# Patient Record
Sex: FEMALE
[De-identification: names, ages, dates, MRNs, and addresses within clinical notes are randomized per-mention and may not be internally consistent; named-entity substitution may affect disease eponyms.]

---

## 2017-04-05 ENCOUNTER — APPOINTMENT (OUTPATIENT)
Dept: NEPHROLOGY | Facility: CLINIC | Age: 42
End: 2017-04-05

## 2017-07-27 ENCOUNTER — RX RENEWAL (OUTPATIENT)
Age: 42
End: 2017-07-27

## 2018-02-21 DIAGNOSIS — Z00.00 ENCOUNTER FOR GENERAL ADULT MEDICAL EXAMINATION W/OUT ABNORMAL FINDINGS: ICD-10-CM

## 2018-07-16 ENCOUNTER — RX RENEWAL (OUTPATIENT)
Age: 43
End: 2018-07-16

## 2018-08-10 ENCOUNTER — RX RENEWAL (OUTPATIENT)
Age: 43
End: 2018-08-10

## 2018-10-15 ENCOUNTER — RX RENEWAL (OUTPATIENT)
Age: 43
End: 2018-10-15

## 2018-10-19 ENCOUNTER — RX RENEWAL (OUTPATIENT)
Age: 43
End: 2018-10-19

## 2019-04-23 ENCOUNTER — RX RENEWAL (OUTPATIENT)
Age: 44
End: 2019-04-23

## 2019-04-29 ENCOUNTER — RX RENEWAL (OUTPATIENT)
Age: 44
End: 2019-04-29

## 2019-08-26 ENCOUNTER — RX RENEWAL (OUTPATIENT)
Age: 44
End: 2019-08-26

## 2019-12-06 RX ORDER — TOPIRAMATE 100 MG/1
100 TABLET, FILM COATED ORAL
Refills: 0 | Status: ACTIVE | COMMUNITY
Start: 2019-12-06

## 2020-01-06 ENCOUNTER — APPOINTMENT (OUTPATIENT)
Dept: NEPHROLOGY | Facility: CLINIC | Age: 45
End: 2020-01-06
Payer: COMMERCIAL

## 2020-01-06 VITALS
DIASTOLIC BLOOD PRESSURE: 76 MMHG | OXYGEN SATURATION: 99 % | BODY MASS INDEX: 20.66 KG/M2 | WEIGHT: 128 LBS | HEART RATE: 66 BPM | SYSTOLIC BLOOD PRESSURE: 119 MMHG

## 2020-01-06 PROCEDURE — 99214 OFFICE O/P EST MOD 30 MIN: CPT

## 2020-01-06 RX ORDER — OXYCODONE AND ACETAMINOPHEN 10; 325 MG/1; MG/1
10-325 TABLET ORAL
Qty: 26 | Refills: 0 | Status: COMPLETED | COMMUNITY
Start: 2019-10-18

## 2020-01-06 RX ORDER — AZITHROMYCIN 250 MG/1
250 TABLET, FILM COATED ORAL
Qty: 6 | Refills: 0 | Status: COMPLETED | COMMUNITY
Start: 2019-05-12

## 2020-01-06 RX ORDER — LEVOTHYROXINE SODIUM 112 UG/1
112 TABLET ORAL
Qty: 90 | Refills: 0 | Status: ACTIVE | COMMUNITY
Start: 2019-09-20

## 2020-01-06 RX ORDER — TOPIRAMATE 50 MG/1
50 TABLET, FILM COATED ORAL
Qty: 270 | Refills: 0 | Status: COMPLETED | COMMUNITY
Start: 2019-10-25

## 2020-01-06 RX ORDER — CIPROFLOXACIN HYDROCHLORIDE 500 MG/1
500 TABLET, FILM COATED ORAL
Qty: 14 | Refills: 0 | Status: COMPLETED | COMMUNITY
Start: 2019-09-10

## 2020-01-06 RX ORDER — METHYLPREDNISOLONE 4 MG/1
4 TABLET ORAL
Qty: 21 | Refills: 0 | Status: COMPLETED | COMMUNITY
Start: 2019-05-12

## 2020-01-06 RX ORDER — CYANOCOBALAMIN 1000 UG/ML
1000 INJECTION INTRAMUSCULAR; SUBCUTANEOUS
Qty: 13 | Refills: 0 | Status: DISCONTINUED | COMMUNITY
Start: 2018-04-16 | End: 2020-01-06

## 2020-01-06 RX ORDER — PROMETHAZINE HYDROCHLORIDE 25 MG/1
25 TABLET ORAL
Qty: 15 | Refills: 0 | Status: COMPLETED | COMMUNITY
Start: 2019-10-18

## 2020-01-06 NOTE — REVIEW OF SYSTEMS
[Feeling Poorly] : not feeling poorly [Feeling Tired] : not feeling tired [Recent Weight Gain (___ Lbs)] : recent [unfilled] ~Ulb weight gain [Loss Of Hearing] : no hearing loss [Sore Throat] : no sore throat [Chest Pain] : no chest pain [Lower Ext Edema] : lower extremity edema [Shortness Of Breath] : no shortness of breath [PND] : no PND [Constipation] : no constipation [Anxiety] : anxiety [Negative] : Heme/Lymph [FreeTextEntry9] : s/p  B toe surgery  [de-identified] : hx of Migraines HAs, CVA, ?sz

## 2020-01-06 NOTE — PHYSICAL EXAM
[General Appearance - Alert] : alert [General Appearance - In No Acute Distress] : in no acute distress [Sclera] : the sclera and conjunctiva were normal [Oropharynx] : the oropharynx was normal [Jugular Venous Distention Increased] : there was no jugular-venous distention [Respiration, Rhythm And Depth] : normal respiratory rhythm and effort [Auscultation Breath Sounds / Voice Sounds] : lungs were clear to auscultation bilaterally [Heart Rate And Rhythm] : heart rate was normal and rhythm regular [Heart Sounds] : normal S1 and S2 [Murmurs] : no murmurs [Abdomen Soft] : soft [Abnormal Walk] : normal gait [Musculoskeletal - Swelling] : no joint swelling seen [] : no rash [FreeTextEntry1] : B toe scars  [Skin Lesions] : no skin lesions [No Focal Deficits] : no focal deficits [Oriented To Time, Place, And Person] : oriented to person, place, and time

## 2020-01-06 NOTE — HISTORY OF PRESENT ILLNESS
[FreeTextEntry1] : she is having more swelling, s/p Toe surgery in Late October. past 6 weeks more swollen. labs show 4.1 grams of proteinuria./ PLAR2A negative. repeat in late Dec done to 2.1gm. \par she had MN in 1991 treated with steroids but was stopped prematurely due to psychotic episode.\par then 2003 had preeclampsia and relapse of MN treated with ACE/ARB combination. \par we increased torsemide to daily last month with good relief \par also started Lipitor 10mg daily( was self DCed)\par and added  asa 81.\par edema is better. no SOB. \par \par \par

## 2020-01-06 NOTE — ASSESSMENT
[FreeTextEntry1] : relapsed MN. negative PLar2A, improving spontaneously, Normal cr ( for 30 years) all point to a secondary process\par plan\par 1. increase candesartan to 32 mg pm and 4mg am( and titrate to 8mg as BP allows)\par 2. increase Torsemide to BID\par 3. repeat blood and urine work in 1 month'\par 4. she is here in June so will come for f/u then.\par 5 HCM uptodate with PAP, mammogram AND COLONOSCOPY\par

## 2020-01-09 ENCOUNTER — RX RENEWAL (OUTPATIENT)
Age: 45
End: 2020-01-09

## 2020-06-02 ENCOUNTER — RX RENEWAL (OUTPATIENT)
Age: 45
End: 2020-06-02

## 2020-09-08 ENCOUNTER — RX RENEWAL (OUTPATIENT)
Age: 45
End: 2020-09-08

## 2021-01-12 ENCOUNTER — RX RENEWAL (OUTPATIENT)
Age: 46
End: 2021-01-12

## 2021-02-09 ENCOUNTER — RX RENEWAL (OUTPATIENT)
Age: 46
End: 2021-02-09

## 2021-07-30 ENCOUNTER — RX RENEWAL (OUTPATIENT)
Age: 46
End: 2021-07-30

## 2022-01-18 DIAGNOSIS — N04.9 NEPHROTIC SYNDROME WITH UNSPECIFIED MORPHOLOGIC CHANGES: ICD-10-CM

## 2022-01-18 DIAGNOSIS — E53.8 DEFICIENCY OF OTHER SPECIFIED B GROUP VITAMINS: ICD-10-CM

## 2022-02-16 ENCOUNTER — APPOINTMENT (OUTPATIENT)
Dept: NEPHROLOGY | Facility: CLINIC | Age: 47
End: 2022-02-16
Payer: COMMERCIAL

## 2022-02-16 VITALS — SYSTOLIC BLOOD PRESSURE: 108 MMHG | WEIGHT: 126 LBS | DIASTOLIC BLOOD PRESSURE: 60 MMHG | BODY MASS INDEX: 20.34 KG/M2

## 2022-02-16 PROCEDURE — 99213 OFFICE O/P EST LOW 20 MIN: CPT | Mod: 95

## 2022-02-16 RX ORDER — MELOXICAM 7.5 MG/1
7.5 TABLET ORAL
Qty: 20 | Refills: 0 | Status: DISCONTINUED | COMMUNITY
Start: 2022-01-26

## 2022-02-16 RX ORDER — BUPROPION HYDROCHLORIDE 300 MG/1
300 TABLET, EXTENDED RELEASE ORAL DAILY
Refills: 0 | Status: ACTIVE | COMMUNITY
Start: 2022-02-16

## 2022-02-16 RX ORDER — ASPIRIN 81 MG/1
81 TABLET ORAL
Qty: 90 | Refills: 3 | Status: DISCONTINUED | COMMUNITY
Start: 2021-07-30 | End: 2022-02-16

## 2022-02-16 NOTE — HISTORY OF PRESENT ILLNESS
[FreeTextEntry1] : hip pain\par edema better on diuretics\par -108/40-60 \par on Atacand 32 +4  and torsemide 5mg BID \par blood work ordered before visit and reviewed today with her... cr up to 1.1 from bl of 0.8.. she had UTI at the time of labs \par proteinuria is well controlled now \par

## 2022-02-16 NOTE — REVIEW OF SYSTEMS
[Feeling Tired] : feeling tired [Recent Weight Gain (___ Lbs)] : recent [unfilled] ~Ulb weight gain [Lower Ext Edema] : lower extremity edema [As Noted in HPI] : as noted in HPI [Anxiety] : anxiety [FreeTextEntry5] : but better

## 2022-02-16 NOTE — REASON FOR VISIT
[Home] : at home, [unfilled] , at the time of the visit. [Other Location: e.g. Home (Enter Location, City,State)___] : at [unfilled] [Verbal consent obtained from patient] : the patient, [unfilled] [Follow-Up] : a follow-up visit [FreeTextEntry1] : MN. ckd

## 2022-02-16 NOTE — ASSESSMENT
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 negative PLar2A, proteinuria is normalized.  cr up to 1.1 likely multifactorial ; ARB, diuretics, UTI, ...(Nl  for 30 years) all point to a secondary process\par plan\par 1. continue candesartan to 32 mg pm and 4mg am\par 2. decrease Torsemide to once a day if able \par 3. AVOID NSAIDS \par monitor labs closely \par RTC in 3 months \par \par \par \par . \par

## 2022-03-07 ENCOUNTER — RX RENEWAL (OUTPATIENT)
Age: 47
End: 2022-03-07

## 2022-03-11 ENCOUNTER — RX RENEWAL (OUTPATIENT)
Age: 47
End: 2022-03-11

## 2022-03-11 RX ORDER — TORSEMIDE 5 MG/1
5 TABLET ORAL TWICE DAILY
Qty: 180 | Refills: 3 | Status: ACTIVE | COMMUNITY
Start: 2019-10-24 | End: 1900-01-01

## 2022-03-17 ENCOUNTER — RX RENEWAL (OUTPATIENT)
Age: 47
End: 2022-03-17

## 2024-03-12 RX ORDER — CANDESARTAN CILEXETIL 8 MG/1
8 TABLET ORAL
Qty: 90 | Refills: 3 | Status: ACTIVE | COMMUNITY
Start: 2020-01-06 | End: 1900-01-01

## 2024-04-15 ENCOUNTER — APPOINTMENT (OUTPATIENT)
Dept: NEPHROLOGY | Facility: CLINIC | Age: 49
End: 2024-04-15
Payer: COMMERCIAL

## 2024-04-15 DIAGNOSIS — N03.2 CHRONIC NEPHRITIC SYNDROME WITH DIFFUSE MEMBRANOUS GLOMERULONEPHRITIS: ICD-10-CM

## 2024-04-15 PROCEDURE — 99214 OFFICE O/P EST MOD 30 MIN: CPT

## 2024-04-15 PROCEDURE — G2211 COMPLEX E/M VISIT ADD ON: CPT | Mod: NC,1L

## 2024-04-15 RX ORDER — ASPIRIN ENTERIC COATED TABLETS 81 MG 81 MG/1
81 TABLET, DELAYED RELEASE ORAL DAILY
Qty: 30 | Refills: 6 | Status: DISCONTINUED | COMMUNITY
Start: 2019-12-06 | End: 2024-04-15

## 2024-04-15 RX ORDER — ERGOCALCIFEROL 1.25 MG/1
1.25 MG CAPSULE, LIQUID FILLED ORAL WEEKLY
Qty: 4 | Refills: 11 | Status: DISCONTINUED | COMMUNITY
Start: 2018-04-16 | End: 2024-04-15

## 2024-04-16 NOTE — HISTORY OF PRESENT ILLNESS
[FreeTextEntry1] : no edema  labs done at her PMD discussed in today's visit., see below. lightheadedness when she stands fast  systolic       no fever and no chills. Cardiovascular: the heart rate was not slow, no chest pain and no palpitations. Respiratory: no shortness of breath, no cough and no shortness of breath during exertion. Gastrointestinal: no abdominal pain, no vomiting and no diarrhea. Genitourinary: no dysuria, no urinary hesitancy and no nocturia. Integumentary: no skin lesions. Neurological: no confusion and no dizziness. Endocrine: no episodes of hypoglycemia Constitutional, Eyes, ENT, Cardiovascular, Respiratory, Gastrointestinal, Genitourinary, Neurological and Psychiatric are otherwise negative.

## 2024-04-16 NOTE — REASON FOR VISIT
[Home] : at home, [unfilled] , at the time of the visit. [Medical Office: ([facility])___] : at the medical office located in  [Patient] : the patient [Self] : self [Follow-Up] : a follow-up visit

## 2024-04-16 NOTE — ASSESSMENT
[FreeTextEntry1] : Chronic membranous glomerulonephritis (582.1) (N03.2) low BP   relapsed MN.in 2021. negative PLar2A, proteinuria is normalized. cr o.83  on  ARB.  she is off diuretics with minimal use.   plan  1. check Urine studies, Rx sent for UA and P.cr ratio. if low then will 2. decrease  candesartan to 32 mg only given low BP and some orthostatic symptoms.   3. AVOID NSAIDS  4. she is UpToDate with appropriate age screening tests as below  Colonoscopy this year. polyps removed and to do another in 3 years Pap/Chandra uptodate   RTC in 6 months

## 2025-08-21 ENCOUNTER — RX RENEWAL (OUTPATIENT)
Age: 50
End: 2025-08-21